# Patient Record
Sex: FEMALE | Race: WHITE | URBAN - METROPOLITAN AREA
[De-identification: names, ages, dates, MRNs, and addresses within clinical notes are randomized per-mention and may not be internally consistent; named-entity substitution may affect disease eponyms.]

---

## 2017-03-15 ENCOUNTER — OFFICE VISIT (OUTPATIENT)
Dept: FAMILY MEDICINE | Facility: CLINIC | Age: 67
End: 2017-03-15
Payer: COMMERCIAL

## 2017-03-15 VITALS
HEART RATE: 94 BPM | DIASTOLIC BLOOD PRESSURE: 87 MMHG | RESPIRATION RATE: 16 BRPM | WEIGHT: 150 LBS | OXYGEN SATURATION: 97 % | BODY MASS INDEX: 26.58 KG/M2 | HEIGHT: 63 IN | TEMPERATURE: 96.6 F | SYSTOLIC BLOOD PRESSURE: 124 MMHG

## 2017-03-15 DIAGNOSIS — G40.909 SEIZURE DISORDER (H): Primary | ICD-10-CM

## 2017-03-15 DIAGNOSIS — I10 ESSENTIAL HYPERTENSION: ICD-10-CM

## 2017-03-15 PROCEDURE — 99213 OFFICE O/P EST LOW 20 MIN: CPT | Performed by: FAMILY MEDICINE

## 2017-03-15 NOTE — PROGRESS NOTES
"  SUBJECTIVE:                                                    Charo Cummings is a 66 year old female who presents to clinic today for the following health issues:    Forms    Pt has Hx of having had seizure in 2001.  She has not had an further seizures.  She is not on any medication.  She needs form completed for driving    Hyperlipidemia Follow-Up      Watching cholesterol in diet?: Yes    Any muscle aches?: no     Hypertension Follow-up      Outpatient blood pressures are being checked at work.  Results are stable in control.    Diet: no added salt       Amount of exercise or daily activities, outside of work: minimal    Problems taking medications regularly No    Medication side effects: No        Problem list and histories reviewed & adjusted, as indicated.  Additional history: as documented    Labs reviewed in EPIC    Reviewed and updated as needed this visit by clinical staff  Tobacco  Allergies  Meds  Problems  Med Hx  Surg Hx  Fam Hx  Soc Hx        Reviewed and updated as needed this visit by Provider  Allergies  Meds  Problems         ROS:  CONSTITUTIONAL:NEGATIVE for fever, chills, change in weight  RESP:NEGATIVE for significant cough or SOB  CV: NEGATIVE for chest pain, palpitations or peripheral edema  NEURO: NEGATIVE for weakness, dizziness or paresthesias and POSITIVE for HX seizure D/O    OBJECTIVE:                                                    /87 (BP Location: Right arm, Patient Position: Chair, Cuff Size: Adult Regular)  Pulse 94  Temp 96.6  F (35.9  C) (Tympanic)  Resp 16  Ht 5' 3\" (1.6 m)  Wt 150 lb (68 kg)  LMP  (LMP Unknown)  SpO2 97%  Breastfeeding? No  BMI 26.57 kg/m2  Body mass index is 26.57 kg/(m^2).  GENERAL APPEARANCE: healthy, alert and no distress  NECK: no adenopathy, no asymmetry, masses, or scars, thyroid normal to palpation and no bruits  RESP: lungs clear to auscultation - no rales, rhonchi or wheezes  CV: regular rates and rhythm, normal S1 S2, no S3 " or S4 and no murmur, click or rub  NEURO: Normal strength and tone, mentation intact, speech normal and Romberg negative  PSYCH: mentation appears normal and affect normal/bright    Diagnostic test results:  none      ASSESSMENT/PLAN:                                                        ICD-10-CM    1. Seizure disorder (H) G40.909    2. Essential hypertension I10        Follow up with Provider - as needed  Discussed with Pt about Health Maintenance screenings, she does not want to do any of them  She has not had any seizures since 2001, not on medication.  Form completed for driving for 4 yr   Patient Instructions   Continue with same medication      Neil Robles MD  [facility]

## 2017-03-15 NOTE — NURSING NOTE
"Chief Complaint   Patient presents with     Forms     /87 (BP Location: Right arm, Patient Position: Chair, Cuff Size: Adult Regular)  Pulse 94  Temp 96.6  F (35.9  C) (Tympanic)  Resp 16  Ht 5' 3\" (1.6 m)  Wt 150 lb (68 kg)  LMP  (LMP Unknown)  SpO2 97%  Breastfeeding? No  BMI 26.57 kg/m2 Estimated body mass index is 26.57 kg/(m^2) as calculated from the following:    Height as of this encounter: 5' 3\" (1.6 m).    Weight as of this encounter: 150 lb (68 kg).  BP completed using cuff size: regular   Bettina Oviedo CMA    Health Maintenance Due   Topic Date Due     ADVANCE DIRECTIVE PLANNING Q5 YRS (NO INBASKET)  12/12/1970     HEPATITIS C SCREENING  12/12/1970     PAP SCREENING Q3 YR (SYSTEM ASSIGNED)  12/12/1973     MAMMO SCREEN Q2 YR (SYSTEM ASSIGNED)  12/12/1992     LIPID SCREEN Q5 YR FEMALE (SYSTEM ASSIGNED)  12/12/1997     COLON CANCER SCREEN (SYSTEM ASSIGNED)  12/12/2002     TETANUS IMMUNIZATION (SYSTEM ASSIGNED)  01/24/2005     Health Maintenance reviewed at today's visit patient asked to schedule/complete:   Breast Cancer:  Patient agrees to schedule  Cervical Cancer:  Patient agrees to schedule  Colon Cancer:  Patient agrees to schedule  Diabetes:  Patient agrees to schedule  Immunizations:  Patient agrees to schedule    "

## 2017-03-15 NOTE — MR AVS SNAPSHOT
After Visit Summary   3/15/2017    Charo Cummings    MRN: [id number]           Patient Information     Date Of Birth          1950        Visit Information        Provider Department      3/15/2017 3:30 PM Neil Robles MD [facility]        Today's Diagnoses     Seizure disorder (H)    -  1    Essential hypertension          Care Instructions    Continue with same medication        Follow-ups after your visit        Follow-up notes from your care team     Return if symptoms worsen or fail to improve.      Who to contact     If you have questions or need follow up information about today's clinic visit or your schedule please contact [facility] directly at [phone].  Normal or non-critical lab and imaging results will be communicated to you by MyChart, letter or phone within 4 business days after the clinic has received the results. If you do not hear from us within 7 days, please contact the clinic through [employer] or phone. If you have a critical or abnormal lab result, we will notify you by phone as soon as possible.  Submit refill requests through Sample[employer] or call your pharmacy and they will forward the refill request to us. Please allow 3 business days for your refill to be completed.          Additional Information About Your Visit        MyChart Information     Sample[employer] gives you secure access to your electronic health record. If you see a primary care provider, you can also send messages to your care team and make appointments. If you have questions, please call your primary care clinic.  If you do not have a primary care provider, please call [phone] and they will assist you.        Care EveryWhere ID     This is your Care EveryWhere ID. This could be used by other organizations to access your [place] medical records  [id number]        Your Vitals Were     Pulse Temperature Respirations Height Last  "Period Pulse Oximetry    94 96.6  F (35.9  C) (Tympanic) 16 5' 3\" (1.6 m) (LMP Unknown) 97%    Breastfeeding? BMI (Body Mass Index)                No 26.57 kg/m2           Blood Pressure from Last 3 Encounters:   03/15/17 124/87   09/16/16 (!) 150/100   04/08/16 111/69    Weight from Last 3 Encounters:   03/15/17 150 lb (68 kg)   09/16/16 150 lb (68 kg)   04/07/16 151 lb 10.8 oz (68.8 kg)              Today, you had the following     No orders found for display       Primary Care Provider Office Phone # Fax #    Neil Robles [phone]       JOLIE Hamel MISSY PACHECO [address]  [facility] [address]        Thank you!     Thank you for choosing [facility]  for your care. Our goal is always to provide you with excellent care. Hearing back from our patients is one way we can continue to improve our services. Please take a few minutes to complete the written survey that you may receive in the mail after your visit with us. Thank you!             Your Updated Medication List - Protect others around you: Learn how to safely use, store and throw away your medicines at www.disposemymeds.org.          This list is accurate as of: 3/15/17  5:15 PM.  Always use your most recent med list.                   Brand Name Dispense Instructions for use    HYDROcodone-acetaminophen 5-325 MG per tablet    NORCO    30 tablet    Take 1-2 tablets by mouth every 4 hours as needed for other (Moderate to Severe Pain)       lisinopril-hydrochlorothiazide 20-25 MG per tablet    PRINZIDE/ZESTORETIC    90 tablet    Take 1 tablet by mouth daily         "

## 2017-06-14 ENCOUNTER — TELEPHONE (OUTPATIENT)
Dept: FAMILY MEDICINE | Facility: CLINIC | Age: 67
End: 2017-06-14

## 2017-06-14 NOTE — TELEPHONE ENCOUNTER
Panel Management Review      Patient has the following on her problem list:     Hypertension   Last three blood pressure readings:  BP Readings from Last 3 Encounters:   03/15/17 124/87   09/16/16 (!) 150/100   04/08/16 111/69     Blood pressure: FAILED    HTN Guidelines:  Age 18-59 BP range:  Less than 140/90  Age 60-85 with Diabetes:  Less than 140/90  Age 60-85 without Diabetes:  less than 150/90      Composite cancer screening  Chart review shows that this patient is due/due soon for the following Pap Smear, Mammogram and Colonoscopy  Summary:    Patient is due/failing the following:   BP CHECK, FOLLOW UP, MAMMOGRAM, PAP and PHYSICAL    Action needed:   Patient needs office visit for follow up, physical, and PAP.    Type of outreach:    Sent letter.    Questions for provider review:    None                                                                                                                                    Princess CHERI Wilson CMA       Chart routed to none .

## 2017-06-14 NOTE — LETTER
[facility] [address]  [address]  [place] [facility] [place] [address]  [phone]                                                                                                           Charo Cummings  [address]  [place] [address]    June 14, 2017          Dear Charo Cummings,      We care about your well-being!  In order to ensure we are providing the best quality care, we have reviewed your chart and see that you are due for:     __X__ Physical   __X__ Pap Smear   __X__ Mammogram   _____ Diabetes Followup   __X__ Hypertension Followup   _____ Cholesterol Followup (Provider Appointment)   _____ Cholesterol Test (Lab-Only Appointment)   _____ Other:       We can assist you in scheduling these appointments at [phone].    If you have had (or plan to have) any of these tests done at a facility other than [facility] or a [facility], please have the results from these tests sent to your primary physician at [facility].             Sincerely,    Neil Robles MD

## 2017-06-17 ENCOUNTER — HEALTH MAINTENANCE LETTER (OUTPATIENT)
Age: 67
End: 2017-06-17

## 2017-09-04 DIAGNOSIS — I10 ESSENTIAL HYPERTENSION WITH GOAL BLOOD PRESSURE LESS THAN 140/90: ICD-10-CM

## 2017-09-05 NOTE — TELEPHONE ENCOUNTER
Lisinopril-hctz 20-25 mg      Last Written Prescription Date: 9/16/16  Last Fill Quantity: 90, # refills: 3  Last Office Visit with G, P or [facility] prescribing provider: 3/15/17       Potassium   Date Value Ref Range Status   04/07/2016 3.3 (L) 3.4 - 5.3 mmol/L Final     Creatinine   Date Value Ref Range Status   04/07/2016 0.65 0.52 - 1.04 mg/dL Final     BP Readings from Last 3 Encounters:   03/15/17 124/87   09/16/16 (!) 150/100   04/08/16 111/69

## 2017-09-07 RX ORDER — LISINOPRIL AND HYDROCHLOROTHIAZIDE 20; 25 MG/1; MG/1
TABLET ORAL
Qty: 90 TABLET | Refills: 0 | Status: SHIPPED | OUTPATIENT
Start: 2017-09-07 | End: 2017-12-06

## 2017-09-07 NOTE — TELEPHONE ENCOUNTER
Routing refill request to provider for review/approval because:  Labs out of range:  K+ and creatinine also not current

## 2018-07-09 DIAGNOSIS — I10 ESSENTIAL HYPERTENSION WITH GOAL BLOOD PRESSURE LESS THAN 140/90: ICD-10-CM

## 2018-07-09 PROCEDURE — 84132 ASSAY OF SERUM POTASSIUM: CPT | Performed by: FAMILY MEDICINE

## 2018-07-09 PROCEDURE — 36415 COLL VENOUS BLD VENIPUNCTURE: CPT | Performed by: FAMILY MEDICINE

## 2018-07-09 PROCEDURE — 82565 ASSAY OF CREATININE: CPT | Performed by: FAMILY MEDICINE

## 2018-07-10 LAB
CREAT SERPL-MCNC: 0.77 MG/DL (ref 0.52–1.04)
GFR SERPL CREATININE-BSD FRML MDRD: 75 ML/MIN/1.7M2
POTASSIUM SERPL-SCNC: 3.9 MMOL/L (ref 3.4–5.3)

## 2018-07-24 DIAGNOSIS — I10 ESSENTIAL HYPERTENSION WITH GOAL BLOOD PRESSURE LESS THAN 140/90: ICD-10-CM

## 2018-07-24 NOTE — TELEPHONE ENCOUNTER
"Requested Prescriptions   Pending Prescriptions Disp Refills     lisinopril-hydrochlorothiazide (PRINZIDE/ZESTORETIC) 20-25 MG per tablet [Pharmacy Med Name: LISINOPRIL-HCTZ 20-25 MG TAB]  Last Written Prescription Date:  7/9/2018  Last Fill Quantity: 15 tablet,  # refills: 0   Last Office Visit  3/15/2017        with  [facility] or [facility] prescribing provider:     Future Office Visit:      15 tablet 0     Sig: TAKE 1 TABLET BY MOUTH DAILY-NEEDS LABS BEFORE MORE REFILLS    Diuretics (Including Combos) Protocol Failed    7/24/2018 12:15 PM       Failed - Blood pressure under 140/90 in past 12 months    BP Readings from Last 3 Encounters:   03/15/17 124/87   09/16/16 (!) 150/100   04/08/16 111/69          Failed - Recent (12 mo) or future (30 days) visit within the authorizing provider's specialty    Patient had office visit in the last 12 months or has a visit in the next 30 days with authorizing provider or within the authorizing provider's specialty.  See \"Patient Info\" tab in inbasket, or \"Choose Columns\" in Meds & Orders section of the refill encounter.           Failed - Normal serum sodium on file in past 12 months    Recent Labs   Lab Test  04/07/16   1645   NA  136           Passed - Patient is age 18 or older       Passed - No active pregancy on record       Passed - Normal serum creatinine on file in past 12 months    Recent Labs   Lab Test  07/09/18   1040   CR  0.77             Passed - Normal serum potassium on file in past 12 months    Recent Labs   Lab Test  07/09/18   1040   POTASSIUM  3.9           Passed - No positive pregnancy test in past 12 months          "

## 2018-07-25 RX ORDER — LISINOPRIL AND HYDROCHLOROTHIAZIDE 20; 25 MG/1; MG/1
TABLET ORAL
Qty: 15 TABLET | Refills: 0 | OUTPATIENT
Start: 2018-07-25

## 2018-07-25 NOTE — TELEPHONE ENCOUNTER
Left detailed voice message asking pt to schedule OV. Angela refill was already given. Asked pt to have her pharmacy contact her new PCP if she has established care at a different clinic.

## 2019-12-15 ENCOUNTER — HEALTH MAINTENANCE LETTER (OUTPATIENT)
Age: 69
End: 2019-12-15

## 2021-01-15 ENCOUNTER — HEALTH MAINTENANCE LETTER (OUTPATIENT)
Age: 71
End: 2021-01-15

## 2021-09-04 ENCOUNTER — HEALTH MAINTENANCE LETTER (OUTPATIENT)
Age: 71
End: 2021-09-04

## 2021-12-25 ENCOUNTER — HEALTH MAINTENANCE LETTER (OUTPATIENT)
Age: 71
End: 2021-12-25

## 2022-02-19 ENCOUNTER — HEALTH MAINTENANCE LETTER (OUTPATIENT)
Age: 72
End: 2022-02-19

## 2022-10-16 ENCOUNTER — HEALTH MAINTENANCE LETTER (OUTPATIENT)
Age: 72
End: 2022-10-16

## 2023-08-26 ENCOUNTER — HEALTH MAINTENANCE LETTER (OUTPATIENT)
Age: 73
End: 2023-08-26